# Patient Record
Sex: FEMALE | Race: WHITE | NOT HISPANIC OR LATINO | URBAN - METROPOLITAN AREA
[De-identification: names, ages, dates, MRNs, and addresses within clinical notes are randomized per-mention and may not be internally consistent; named-entity substitution may affect disease eponyms.]

---

## 2019-06-17 ENCOUNTER — APPOINTMENT (RX ONLY)
Dept: URBAN - METROPOLITAN AREA CLINIC 45 | Facility: CLINIC | Age: 74
Setting detail: DERMATOLOGY
End: 2019-06-17

## 2019-06-17 DIAGNOSIS — D18.0 HEMANGIOMA: ICD-10-CM

## 2019-06-17 DIAGNOSIS — D22 MELANOCYTIC NEVI: ICD-10-CM

## 2019-06-17 DIAGNOSIS — L82.1 OTHER SEBORRHEIC KERATOSIS: ICD-10-CM

## 2019-06-17 DIAGNOSIS — L81.4 OTHER MELANIN HYPERPIGMENTATION: ICD-10-CM

## 2019-06-17 DIAGNOSIS — L91.8 OTHER HYPERTROPHIC DISORDERS OF THE SKIN: ICD-10-CM

## 2019-06-17 DIAGNOSIS — Z85.828 PERSONAL HISTORY OF OTHER MALIGNANT NEOPLASM OF SKIN: ICD-10-CM

## 2019-06-17 PROBLEM — M12.9 ARTHROPATHY, UNSPECIFIED: Status: ACTIVE | Noted: 2019-06-17

## 2019-06-17 PROBLEM — E03.9 HYPOTHYROIDISM, UNSPECIFIED: Status: ACTIVE | Noted: 2019-06-17

## 2019-06-17 PROBLEM — D22.5 MELANOCYTIC NEVI OF TRUNK: Status: ACTIVE | Noted: 2019-06-17

## 2019-06-17 PROBLEM — D18.01 HEMANGIOMA OF SKIN AND SUBCUTANEOUS TISSUE: Status: ACTIVE | Noted: 2019-06-17

## 2019-06-17 PROBLEM — D48.5 NEOPLASM OF UNCERTAIN BEHAVIOR OF SKIN: Status: ACTIVE | Noted: 2019-06-17

## 2019-06-17 PROBLEM — I10 ESSENTIAL (PRIMARY) HYPERTENSION: Status: ACTIVE | Noted: 2019-06-17

## 2019-06-17 PROCEDURE — ? COUNSELING

## 2019-06-17 PROCEDURE — ? SHAVE REMOVAL (NO PATHOLOGY)

## 2019-06-17 PROCEDURE — 11102 TANGNTL BX SKIN SINGLE LES: CPT

## 2019-06-17 PROCEDURE — 11300 SHAVE SKIN LESION 0.5 CM/<: CPT | Mod: 59

## 2019-06-17 PROCEDURE — ? BIOPSY BY SHAVE METHOD

## 2019-06-17 PROCEDURE — 99202 OFFICE O/P NEW SF 15 MIN: CPT | Mod: 25

## 2019-06-17 PROCEDURE — ? INVENTORY

## 2019-06-17 ASSESSMENT — LOCATION DETAILED DESCRIPTION DERM
LOCATION DETAILED: RIGHT BUTTOCK
LOCATION DETAILED: RIGHT INFRAMAMMARY CREASE (INNER QUADRANT)
LOCATION DETAILED: SUPERIOR LUMBAR SPINE
LOCATION DETAILED: RIGHT INFERIOR UPPER BACK
LOCATION DETAILED: RIGHT SUPERIOR UPPER BACK

## 2019-06-17 ASSESSMENT — LOCATION SIMPLE DESCRIPTION DERM
LOCATION SIMPLE: RIGHT BREAST
LOCATION SIMPLE: RIGHT UPPER BACK
LOCATION SIMPLE: RIGHT BUTTOCK
LOCATION SIMPLE: LOWER BACK

## 2019-06-17 ASSESSMENT — LOCATION ZONE DERM: LOCATION ZONE: TRUNK

## 2019-06-17 NOTE — PROCEDURE: SHAVE REMOVAL (NO PATHOLOGY)
Anesthesia Type: 1% lidocaine with epinephrine
Path Notes (To The Dermatopathologist): Please check margins.
Hemostasis: Drysol
Detail Level: Detailed
Include Z78.9 (Other Specified Conditions Influencing Health Status) As An Associated Diagnosis?: No
Wound Care: Petrolatum
Post-Care Instructions: I reviewed with the patient in detail post-care instructions. Patient is to keep the biopsy site dry overnight, and then apply bacitracin twice daily until healed. Patient may apply hydrogen peroxide soaks to remove any crusting.
Medical Necessity Information: It is in your best interest to select a reason for this procedure from the list below. All of these items fulfill various CMS LCD requirements except the new and changing color options.
Medical Necessity Clause: This procedure was medically necessary because the lesion that was treated was:
Consent was obtained from the patient. The risks and benefits to therapy were discussed in detail. Specifically, the risks of infection, scarring, bleeding, prolonged wound healing, incomplete removal, allergy to anesthesia, nerve injury and recurrence were addressed. Prior to the procedure, the treatment site was clearly identified and confirmed by the patient. All components of Universal Protocol/PAUSE Rule completed.
X Size Of Lesion In Cm (Optional): 0

## 2019-06-17 NOTE — HPI: EVALUATION OF SKIN LESION(S)
What Type Of Note Output Would You Prefer (Optional)?: Bullet Format
Hpi Title: Evaluation of Skin Lesions
Have Your Spot(S) Been Treated In The Past?: has not been treated
4

## 2019-06-17 NOTE — PROCEDURE: BIOPSY BY SHAVE METHOD
Post-Care Instructions: I reviewed with the patient in detail post-care instructions. Patient is to keep the site covered for 24 hours. After this time period they may gently cleanse the site during baths/showers,  they may then  apply  petrolatum or Aquaphor and a bandage daily until healed.
Was A Bandage Applied: Yes
Anesthesia Volume In Cc (Will Not Render If 0): 1
Dressing: bandage
Billing Type: Third-Party Bill
Electrodesiccation And Curettage Text: The wound bed was treated with electrodesiccation and curettage after the biopsy was performed.
Lab: 6
Curettage Text: The wound bed was treated with curettage after the biopsy was performed.
Bill For Surgical Tray: no
Hemostasis: Aluminum Chloride
Silver Nitrate Text: The wound bed was treated with silver nitrate after the biopsy was performed.
Detail Level: Detailed
Biopsy Method: double edge Personna blade
Notification Instructions: Patient will be notified of biopsy results. However, patient instructed to call the office if not contacted within 2 weeks.
Additional Anesthesia Volume In Cc (Will Not Render If 0): 0
Cryotherapy Text: The wound bed was treated with cryotherapy after the biopsy was performed.
Lab Facility: 3
Wound Care: Aquaphor
Anesthesia Type: 1% lidocaine with epinephrine and a 1:10 solution of 8.4% sodium bicarbonate
Consent: Written consent was obtained and risks were reviewed including but not limited to scarring, infection, bleeding, scabbing, incomplete removal, nerve damage and allergy to anesthesia.
Biopsy Type: H and E
Electrodesiccation Text: The wound bed was treated with electrodesiccation after the biopsy was performed.
Type Of Destruction Used: Curettage
Depth Of Biopsy: dermis

## 2019-08-01 ENCOUNTER — APPOINTMENT (RX ONLY)
Dept: URBAN - METROPOLITAN AREA CLINIC 44 | Facility: CLINIC | Age: 74
Setting detail: DERMATOLOGY
End: 2019-08-01

## 2019-08-01 DIAGNOSIS — L57.0 ACTINIC KERATOSIS: ICD-10-CM

## 2019-08-01 DIAGNOSIS — L98.8 OTHER SPECIFIED DISORDERS OF THE SKIN AND SUBCUTANEOUS TISSUE: ICD-10-CM

## 2019-08-01 PROCEDURE — ? MEDICATION COUNSELING

## 2019-08-01 PROCEDURE — ? COUNSELING

## 2019-08-01 PROCEDURE — 99213 OFFICE O/P EST LOW 20 MIN: CPT

## 2019-08-01 PROCEDURE — ? ADDITIONAL NOTES

## 2019-08-01 ASSESSMENT — LOCATION DETAILED DESCRIPTION DERM: LOCATION DETAILED: MIDDLE STERNUM

## 2019-08-01 ASSESSMENT — LOCATION ZONE DERM: LOCATION ZONE: TRUNK

## 2019-08-01 ASSESSMENT — LOCATION SIMPLE DESCRIPTION DERM: LOCATION SIMPLE: CHEST

## 2019-08-01 NOTE — PROCEDURE: ADDITIONAL NOTES
Additional Notes: Patient made aware of retinol ,vitamin c serum and glycolic washes
Detail Level: Simple

## 2019-08-01 NOTE — PROCEDURE: MEDICATION COUNSELING
Xelawaisz Pregnancy And Lactation Text: This medication is Pregnancy Category D and is not considered safe during pregnancy.  The risk during breast feeding is also uncertain.

## 2021-06-24 ENCOUNTER — LAB OUTSIDE AN ENCOUNTER (OUTPATIENT)
Dept: URBAN - NONMETROPOLITAN AREA CLINIC 13 | Facility: CLINIC | Age: 76
End: 2021-06-24

## 2021-06-24 ENCOUNTER — OFFICE VISIT (OUTPATIENT)
Dept: URBAN - NONMETROPOLITAN AREA CLINIC 13 | Facility: CLINIC | Age: 76
End: 2021-06-24
Payer: MEDICARE

## 2021-06-24 DIAGNOSIS — R10.9 RIGHT FLANK PAIN: ICD-10-CM

## 2021-06-24 DIAGNOSIS — Z86.010 PERSONAL HISTORY OF COLONIC POLYPS: ICD-10-CM

## 2021-06-24 DIAGNOSIS — K21.9 GASTROESOPHAGEAL REFLUX DISEASE, UNSPECIFIED WHETHER ESOPHAGITIS PRESENT: ICD-10-CM

## 2021-06-24 DIAGNOSIS — K59.01 SLOW TRANSIT CONSTIPATION: ICD-10-CM

## 2021-06-24 DIAGNOSIS — R10.32 LEFT LOWER QUADRANT ABDOMINAL PAIN: ICD-10-CM

## 2021-06-24 DIAGNOSIS — R13.10 ESOPHAGEAL DYSPHAGIA: ICD-10-CM

## 2021-06-24 PROCEDURE — 99204 OFFICE O/P NEW MOD 45 MIN: CPT | Performed by: INTERNAL MEDICINE

## 2021-06-24 PROCEDURE — 99245 OFF/OP CONSLTJ NEW/EST HI 55: CPT | Performed by: INTERNAL MEDICINE

## 2021-06-24 RX ORDER — LOSARTAN POTASSIUM 50 MG/1
TABLET, FILM COATED ORAL
Qty: 90 UNSPECIFIED | Status: ACTIVE | COMMUNITY

## 2021-06-24 RX ORDER — ACYCLOVIR 400 MG/1
TABLET ORAL
Qty: 35 UNSPECIFIED | Status: ACTIVE | COMMUNITY

## 2021-06-24 RX ORDER — METFORMIN HYDROCHLORIDE 500 MG/1
TABLET ORAL
Qty: 90 UNSPECIFIED | Status: ACTIVE | COMMUNITY

## 2021-06-24 RX ORDER — ATORVASTATIN CALCIUM 20 MG/1
TABLET, FILM COATED ORAL
Qty: 90 UNSPECIFIED | Status: ACTIVE | COMMUNITY

## 2021-06-24 RX ORDER — DICLOFENAC SODIUM 10 MG/G
AS DIRECTED GEL TOPICAL
Status: ACTIVE | COMMUNITY

## 2021-06-24 RX ORDER — INDOMETHACIN 25 MG/1
1 CAPSULE WITH FOOD OR MILK CAPSULE ORAL TWICE A DAY
Status: ACTIVE | COMMUNITY

## 2021-06-24 RX ORDER — DULOXETINE HYDROCHLORIDE 30 MG/1
CAPSULE, DELAYED RELEASE ORAL
Qty: 30 UNSPECIFIED | Status: ACTIVE | COMMUNITY

## 2021-06-24 RX ORDER — PANTOPRAZOLE SODIUM 40 MG/1
TABLET, DELAYED RELEASE ORAL
Qty: 30 UNSPECIFIED | Status: ACTIVE | COMMUNITY

## 2021-06-24 RX ORDER — LEVOTHYROXINE SODIUM 0.09 MG/1
TABLET ORAL
Qty: 30 UNSPECIFIED | Status: ACTIVE | COMMUNITY

## 2021-06-24 NOTE — PHYSICAL EXAM GASTROINTESTINAL
Abdomen , soft, diffusely tender with marked tenderness in LLQ, nondistended , no guarding or rigidity , no masses palpable , normal bowel sounds , Liver and Spleen , no hepatomegaly present , no hepatosplenomegaly , liver nontender , spleen not palpable

## 2021-06-24 NOTE — HPI-TODAY'S VISIT:
The patient was referred by Dr. Kath Tapia for evaluation of multiple GI complaints.   A copy of this document is being forwarded to the referring provider. This is the first office visit for this 75-year-old white female who presents with irregular bowel habits, abdominal distention and discomfort, right flank pain, reflux and dysphagia. Patient describes diffuse abdominal bloating, distention and generalized discomfort with significant left lower quadrant stabbing pain that tends to come and go.  This does not awaken her from sleep at night.  She does not seem to think the bowel movements helped the discomfort.  Pain is not associated with the urge to defecate.  Her bowel movements alternate between constipation and diarrhea.  She can go several days with no bowel movement and then have multiple loose stools.  She has no evidence of bleeding.  She denies fever. There is no family history of colon cancer or colon polyps.  She has not been anemic or iron deficient.  She had a colonoscopy in 2017 at which time polyps were removed. She does describe right flank pain that is worsened with movement, coughing or deep breath.  This is been present for about the last 2 weeks.  She did have some bronchitis recently and had been doing some coughing.  She denies any trauma. Her GB is out and she has had no liver issues that she is aware of. She does take Protonix daily but continues to have occasional heartburn.  She has noted solid food dysphagia which is relieved by drinking liquids.  This does seem to be increasing in frequency.  She has no extra esophageal symptoms.  She did have an EGD in 2019 at which time erosions were identified.

## 2021-08-16 ENCOUNTER — OFFICE VISIT (OUTPATIENT)
Dept: URBAN - METROPOLITAN AREA MEDICAL CENTER 1 | Facility: MEDICAL CENTER | Age: 76
End: 2021-08-16
Payer: MEDICARE

## 2021-08-16 DIAGNOSIS — R13.19 CERVICAL DYSPHAGIA: ICD-10-CM

## 2021-08-16 DIAGNOSIS — K29.30 CHRONIC SUPERFICIAL GASTRITIS: ICD-10-CM

## 2021-08-16 PROCEDURE — 43239 EGD BIOPSY SINGLE/MULTIPLE: CPT | Performed by: INTERNAL MEDICINE

## 2021-08-16 PROCEDURE — 43450 DILATE ESOPHAGUS 1/MULT PASS: CPT | Performed by: INTERNAL MEDICINE

## 2021-09-09 ENCOUNTER — LAB OUTSIDE AN ENCOUNTER (OUTPATIENT)
Dept: URBAN - NONMETROPOLITAN AREA CLINIC 13 | Facility: CLINIC | Age: 76
End: 2021-09-09

## 2021-09-09 ENCOUNTER — WEB ENCOUNTER (OUTPATIENT)
Dept: URBAN - NONMETROPOLITAN AREA CLINIC 13 | Facility: CLINIC | Age: 76
End: 2021-09-09

## 2021-09-09 ENCOUNTER — OFFICE VISIT (OUTPATIENT)
Dept: URBAN - NONMETROPOLITAN AREA CLINIC 13 | Facility: CLINIC | Age: 76
End: 2021-09-09

## 2021-09-09 ENCOUNTER — OFFICE VISIT (OUTPATIENT)
Dept: URBAN - NONMETROPOLITAN AREA CLINIC 13 | Facility: CLINIC | Age: 76
End: 2021-09-09
Payer: MEDICARE

## 2021-09-09 VITALS
DIASTOLIC BLOOD PRESSURE: 89 MMHG | WEIGHT: 175.6 LBS | HEART RATE: 76 BPM | HEIGHT: 66 IN | BODY MASS INDEX: 28.22 KG/M2 | SYSTOLIC BLOOD PRESSURE: 145 MMHG

## 2021-09-09 DIAGNOSIS — R10.9 RIGHT FLANK PAIN: ICD-10-CM

## 2021-09-09 DIAGNOSIS — K86.2 PANCREATIC CYST: ICD-10-CM

## 2021-09-09 DIAGNOSIS — R13.10 ESOPHAGEAL DYSPHAGIA: ICD-10-CM

## 2021-09-09 DIAGNOSIS — K59.01 SLOW TRANSIT CONSTIPATION: ICD-10-CM

## 2021-09-09 DIAGNOSIS — R10.32 LEFT LOWER QUADRANT ABDOMINAL PAIN: ICD-10-CM

## 2021-09-09 DIAGNOSIS — K21.9 GASTROESOPHAGEAL REFLUX DISEASE, UNSPECIFIED WHETHER ESOPHAGITIS PRESENT: ICD-10-CM

## 2021-09-09 DIAGNOSIS — Z86.010 PERSONAL HISTORY OF COLONIC POLYPS: ICD-10-CM

## 2021-09-09 PROCEDURE — 99214 OFFICE O/P EST MOD 30 MIN: CPT | Performed by: NURSE PRACTITIONER

## 2021-09-09 RX ORDER — INDOMETHACIN 25 MG/1
1 CAPSULE WITH FOOD OR MILK CAPSULE ORAL TWICE A DAY
Status: ACTIVE | COMMUNITY

## 2021-09-09 RX ORDER — ATORVASTATIN CALCIUM 20 MG/1
TABLET, FILM COATED ORAL
Qty: 90 UNSPECIFIED | Status: ACTIVE | COMMUNITY

## 2021-09-09 RX ORDER — DULOXETINE HYDROCHLORIDE 30 MG/1
CAPSULE, DELAYED RELEASE ORAL
Qty: 30 UNSPECIFIED | Status: ACTIVE | COMMUNITY

## 2021-09-09 RX ORDER — ACYCLOVIR 400 MG/1
TABLET ORAL
Qty: 35 UNSPECIFIED | Status: ACTIVE | COMMUNITY

## 2021-09-09 RX ORDER — METFORMIN HYDROCHLORIDE 500 MG/1
TABLET ORAL
Qty: 90 UNSPECIFIED | Status: ACTIVE | COMMUNITY

## 2021-09-09 RX ORDER — LEVOTHYROXINE SODIUM 0.09 MG/1
TABLET ORAL
Qty: 30 UNSPECIFIED | Status: ACTIVE | COMMUNITY

## 2021-09-09 RX ORDER — LOSARTAN POTASSIUM 50 MG/1
TABLET, FILM COATED ORAL
Qty: 90 UNSPECIFIED | Status: ACTIVE | COMMUNITY

## 2021-09-09 RX ORDER — DICLOFENAC SODIUM 10 MG/G
AS DIRECTED GEL TOPICAL
Status: ACTIVE | COMMUNITY

## 2021-09-09 RX ORDER — PANTOPRAZOLE SODIUM 40 MG/1
TABLET, DELAYED RELEASE ORAL
Qty: 30 UNSPECIFIED | Status: ACTIVE | COMMUNITY

## 2021-09-09 NOTE — HPI-TODAY'S VISIT:
6/24/21 The patient was referred by Dr. aKth Tapia for evaluation of multiple GI complaints.   A copy of this document is being forwarded to the referring provider. This is the first office visit for this 75-year-old white female who presents with irregular bowel habits, abdominal distention and discomfort, right flank pain, reflux and dysphagia. Patient describes diffuse abdominal bloating, distention and generalized discomfort with significant left lower quadrant stabbing pain that tends to come and go.  This does not awaken her from sleep at night.  She does not seem to think the bowel movements helped the discomfort.  Pain is not associated with the urge to defecate.  Her bowel movements alternate between constipation and diarrhea.  She can go several days with no bowel movement and then have multiple loose stools.  She has no evidence of bleeding.  She denies fever. There is no family history of colon cancer or colon polyps.  She has not been anemic or iron deficient.  She had a colonoscopy in 2017 at which time polyps were removed. She does describe right flank pain that is worsened with movement, coughing or deep breath.  This is been present for about the last 2 weeks.  She did have some bronchitis recently and had been doing some coughing.  She denies any trauma. Her GB is out and she has had no liver issues that she is aware of. She does take Protonix daily but continues to have occasional heartburn.  She has noted solid food dysphagia which is relieved by drinking liquids.  This does seem to be increasing in frequency.  She has no extra esophageal symptoms.  She did have an EGD in 2019 at which time erosions were identified.  9/9/21 Brii presents for follow-up after EGD and and colonoscopy. EGD by Dr. Bosch 8/16/2021 with no endoscopic abnormality to explain her dysphagia status post empiric dilation and otherwise normal-appearing esophagus, gastric mucosa, and duodenum.  Gastric biopsy with mild chronic gastritis negative for Helicobacter and esophageal biopsy with benign mucosa. She also had CT 6/30/2021 with a small cystic structure seen just inferior to the head of the pancreas and probably is pancreatic in origin with recommendation to repeat imaging in 6 months, moderate hepatic steatosis, no inflammatory process to explain pain She did complete the x-ray of her ribs that Dr. Bosch ordered that revealed probable acute rib fracture to the medial most posterior right 12th rib. She denies any fall or traumatic event to explain her rib fracture.  She does agree that this is led to more sedentary lifestyle due to her discomfort and has likely contributed to her constipation which she is struggling with.  TG

## 2021-09-21 ENCOUNTER — OFFICE VISIT (OUTPATIENT)
Dept: URBAN - NONMETROPOLITAN AREA SURGERY CENTER 1 | Facility: SURGERY CENTER | Age: 76
End: 2021-09-21
Payer: MEDICARE

## 2021-09-21 ENCOUNTER — CLAIMS CREATED FROM THE CLAIM WINDOW (OUTPATIENT)
Dept: URBAN - METROPOLITAN AREA CLINIC 4 | Facility: CLINIC | Age: 76
End: 2021-09-21
Payer: MEDICARE

## 2021-09-21 DIAGNOSIS — R10.32 ABDOMINAL CRAMPING IN LEFT LOWER QUADRANT: ICD-10-CM

## 2021-09-21 DIAGNOSIS — K63.89 POLYP, HYPERPLASTIC: ICD-10-CM

## 2021-09-21 DIAGNOSIS — R19.4 ALTERATION IN BOWEL ELIMINATION: ICD-10-CM

## 2021-09-21 PROCEDURE — 88305 TISSUE EXAM BY PATHOLOGIST: CPT | Performed by: PATHOLOGY

## 2021-09-21 PROCEDURE — 45380 COLONOSCOPY AND BIOPSY: CPT | Performed by: INTERNAL MEDICINE

## 2021-09-21 PROCEDURE — G8907 PT DOC NO EVENTS ON DISCHARG: HCPCS | Performed by: INTERNAL MEDICINE

## 2021-09-21 RX ORDER — DICLOFENAC SODIUM 10 MG/G
AS DIRECTED GEL TOPICAL
Status: ACTIVE | COMMUNITY

## 2021-09-21 RX ORDER — METFORMIN HYDROCHLORIDE 500 MG/1
TABLET ORAL
Qty: 90 UNSPECIFIED | Status: ACTIVE | COMMUNITY

## 2021-09-21 RX ORDER — ACYCLOVIR 400 MG/1
TABLET ORAL
Qty: 35 UNSPECIFIED | Status: ACTIVE | COMMUNITY

## 2021-09-21 RX ORDER — INDOMETHACIN 25 MG/1
1 CAPSULE WITH FOOD OR MILK CAPSULE ORAL TWICE A DAY
Status: ACTIVE | COMMUNITY

## 2021-09-21 RX ORDER — LOSARTAN POTASSIUM 50 MG/1
TABLET, FILM COATED ORAL
Qty: 90 UNSPECIFIED | Status: ACTIVE | COMMUNITY

## 2021-09-21 RX ORDER — ATORVASTATIN CALCIUM 20 MG/1
TABLET, FILM COATED ORAL
Qty: 90 UNSPECIFIED | Status: ACTIVE | COMMUNITY

## 2021-09-21 RX ORDER — LEVOTHYROXINE SODIUM 0.09 MG/1
TABLET ORAL
Qty: 30 UNSPECIFIED | Status: ACTIVE | COMMUNITY

## 2021-09-21 RX ORDER — PANTOPRAZOLE SODIUM 40 MG/1
TABLET, DELAYED RELEASE ORAL
Qty: 30 UNSPECIFIED | Status: ACTIVE | COMMUNITY

## 2021-09-21 RX ORDER — DULOXETINE HYDROCHLORIDE 30 MG/1
CAPSULE, DELAYED RELEASE ORAL
Qty: 30 UNSPECIFIED | Status: ACTIVE | COMMUNITY

## 2021-10-29 ENCOUNTER — LAB OUTSIDE AN ENCOUNTER (OUTPATIENT)
Dept: URBAN - NONMETROPOLITAN AREA CLINIC 2 | Facility: CLINIC | Age: 76
End: 2021-10-29

## 2021-10-29 ENCOUNTER — OFFICE VISIT (OUTPATIENT)
Dept: URBAN - NONMETROPOLITAN AREA CLINIC 2 | Facility: CLINIC | Age: 76
End: 2021-10-29
Payer: MEDICARE

## 2021-10-29 DIAGNOSIS — R10.9 RIGHT FLANK PAIN: ICD-10-CM

## 2021-10-29 DIAGNOSIS — K21.9 GASTROESOPHAGEAL REFLUX DISEASE, UNSPECIFIED WHETHER ESOPHAGITIS PRESENT: ICD-10-CM

## 2021-10-29 DIAGNOSIS — K86.2 PANCREATIC CYST: ICD-10-CM

## 2021-10-29 DIAGNOSIS — K59.01 SLOW TRANSIT CONSTIPATION: ICD-10-CM

## 2021-10-29 DIAGNOSIS — Z86.010 PERSONAL HISTORY OF COLONIC POLYPS: ICD-10-CM

## 2021-10-29 DIAGNOSIS — K76.0 FATTY LIVER: ICD-10-CM

## 2021-10-29 DIAGNOSIS — R13.10 ESOPHAGEAL DYSPHAGIA: ICD-10-CM

## 2021-10-29 PROCEDURE — 99214 OFFICE O/P EST MOD 30 MIN: CPT | Performed by: INTERNAL MEDICINE

## 2021-10-29 RX ORDER — PANTOPRAZOLE SODIUM 40 MG/1
TABLET, DELAYED RELEASE ORAL
Qty: 30 UNSPECIFIED | Status: ACTIVE | COMMUNITY

## 2021-10-29 RX ORDER — DULOXETINE HYDROCHLORIDE 30 MG/1
CAPSULE, DELAYED RELEASE ORAL
Qty: 30 UNSPECIFIED | Status: ACTIVE | COMMUNITY

## 2021-10-29 RX ORDER — ACYCLOVIR 400 MG/1
TABLET ORAL
Qty: 35 UNSPECIFIED | Status: ACTIVE | COMMUNITY

## 2021-10-29 RX ORDER — DICLOFENAC SODIUM 10 MG/G
AS DIRECTED GEL TOPICAL
Status: ACTIVE | COMMUNITY

## 2021-10-29 RX ORDER — INDOMETHACIN 25 MG/1
1 CAPSULE WITH FOOD OR MILK CAPSULE ORAL TWICE A DAY
Status: ACTIVE | COMMUNITY

## 2021-10-29 RX ORDER — LEVOTHYROXINE SODIUM 0.09 MG/1
TABLET ORAL
Qty: 30 UNSPECIFIED | Status: ACTIVE | COMMUNITY

## 2021-10-29 RX ORDER — ATORVASTATIN CALCIUM 20 MG/1
TABLET, FILM COATED ORAL
Qty: 90 UNSPECIFIED | Status: ACTIVE | COMMUNITY

## 2021-10-29 RX ORDER — METFORMIN HYDROCHLORIDE 500 MG/1
TABLET ORAL
Qty: 90 UNSPECIFIED | Status: ACTIVE | COMMUNITY

## 2021-10-29 RX ORDER — LOSARTAN POTASSIUM 50 MG/1
TABLET, FILM COATED ORAL
Qty: 90 UNSPECIFIED | Status: ACTIVE | COMMUNITY

## 2021-10-29 NOTE — HPI-TODAY'S VISIT:
Patient states that, overall, she is much improved. Her left lower quadrant pain has resolved. Her constipation is improved. She is having a solid, formed stool daily. Her stools do feel adequate. She has no evidence of bleeding. She denies gas or bloating. Her appetite and weight are stable. Her reflux is well controlled. She is on Protonix daily. Her dysphagia has resolved with dilation. She does have fatty liver disease and a pancreatic cyst. She has no symptoms related to these. She does need imaging and labs.

## 2021-10-30 LAB
A/G RATIO: 2.1
ALBUMIN: 4.7
ALKALINE PHOSPHATASE: 59
ALT (SGPT): 26
AST (SGOT): 20
BASO (ABSOLUTE): 0.1
BASOS: 1
BILIRUBIN, TOTAL: 0.7
BUN/CREATININE RATIO: 15
BUN: 12
CALCIUM: 10
CARBON DIOXIDE, TOTAL: 24
CHLORIDE: 107
CREATININE: 0.78
EGFR IF AFRICN AM: 87
EGFR IF NONAFRICN AM: 75
EOS (ABSOLUTE): 0.1
EOS: 2
GLOBULIN, TOTAL: 2.2
GLUCOSE: 124
HEMATOCRIT: 38.8
HEMATOLOGY COMMENTS:: (no result)
HEMOGLOBIN: 13.1
IMMATURE CELLS: (no result)
IMMATURE GRANS (ABS): 0
IMMATURE GRANULOCYTES: 0
LYMPHS (ABSOLUTE): 2.9
LYMPHS: 45
MCH: 31.4
MCHC: 33.8
MCV: 93
MONOCYTES(ABSOLUTE): 0.3
MONOCYTES: 5
NEUTROPHILS (ABSOLUTE): 3.1
NEUTROPHILS: 47
NRBC: (no result)
PLATELETS: 202
POTASSIUM: 4.2
PROTEIN, TOTAL: 6.9
RBC: 4.17
RDW: 11.9
SODIUM: 146
WBC: 6.5

## 2021-12-13 NOTE — PROCEDURE: MEDICATION COUNSELING
EYSSI Blair Rhofade Counseling: Rhofade is a topical medication which can decrease superficial blood flow where applied. Side effects are uncommon and include stinging, redness and allergic reactions.

## 2022-01-01 NOTE — PROCEDURE: MEDICATION COUNSELING
Albendazole Pregnancy And Lactation Text: This medication is Pregnancy Category C and it isn't known if it is safe during pregnancy. It is also excreted in breast milk. Spinal

## 2022-04-29 ENCOUNTER — OFFICE VISIT (OUTPATIENT)
Dept: URBAN - NONMETROPOLITAN AREA CLINIC 13 | Facility: CLINIC | Age: 77
End: 2022-04-29

## 2022-08-15 ENCOUNTER — LAB OUTSIDE AN ENCOUNTER (OUTPATIENT)
Dept: URBAN - NONMETROPOLITAN AREA CLINIC 13 | Facility: CLINIC | Age: 77
End: 2022-08-15

## 2022-08-15 ENCOUNTER — LAB OUTSIDE AN ENCOUNTER (OUTPATIENT)
Dept: URBAN - NONMETROPOLITAN AREA CLINIC 2 | Facility: CLINIC | Age: 77
End: 2022-08-15

## 2022-08-15 ENCOUNTER — OFFICE VISIT (OUTPATIENT)
Dept: URBAN - NONMETROPOLITAN AREA CLINIC 13 | Facility: CLINIC | Age: 77
End: 2022-08-15
Payer: MEDICARE

## 2022-08-15 VITALS
WEIGHT: 169 LBS | HEART RATE: 90 BPM | SYSTOLIC BLOOD PRESSURE: 133 MMHG | HEIGHT: 66 IN | DIASTOLIC BLOOD PRESSURE: 72 MMHG | TEMPERATURE: 97 F | BODY MASS INDEX: 27.16 KG/M2

## 2022-08-15 DIAGNOSIS — K59.01 SLOW TRANSIT CONSTIPATION: ICD-10-CM

## 2022-08-15 DIAGNOSIS — K21.9 GASTROESOPHAGEAL REFLUX DISEASE, UNSPECIFIED WHETHER ESOPHAGITIS PRESENT: ICD-10-CM

## 2022-08-15 DIAGNOSIS — K86.2 PANCREATIC CYST: ICD-10-CM

## 2022-08-15 DIAGNOSIS — K76.0 FATTY LIVER: ICD-10-CM

## 2022-08-15 DIAGNOSIS — Z86.010 PERSONAL HISTORY OF COLONIC POLYPS: ICD-10-CM

## 2022-08-15 DIAGNOSIS — R10.84 ABDOMINAL CRAMPING, GENERALIZED: ICD-10-CM

## 2022-08-15 DIAGNOSIS — R13.19 CERVICAL DYSPHAGIA: ICD-10-CM

## 2022-08-15 LAB
ANION GAP: 10
BLOOD UREA NITROGEN: 17
BUN / CREAT RATIO: 18
BUN / CREAT RATIO: 18
CALCIUM: 10.1
CALCIUM: 10.1
CHLORIDE: 107
CO2: 27
CREATININE, SERUM: 0.93
EGFR (CKD-EPI): >60
GLUCOSE: 165
POTASSIUM: 4.7
SODIUM: 139

## 2022-08-15 PROCEDURE — 99214 OFFICE O/P EST MOD 30 MIN: CPT | Performed by: NURSE PRACTITIONER

## 2022-08-15 RX ORDER — ATORVASTATIN CALCIUM 20 MG/1
TABLET, FILM COATED ORAL
Qty: 90 UNSPECIFIED | Status: ACTIVE | COMMUNITY

## 2022-08-15 RX ORDER — LOSARTAN POTASSIUM 50 MG/1
TABLET, FILM COATED ORAL
Qty: 90 UNSPECIFIED | Status: ACTIVE | COMMUNITY

## 2022-08-15 RX ORDER — PANTOPRAZOLE SODIUM 40 MG/1
1 TABLET TABLET, DELAYED RELEASE ORAL ONCE A DAY
Qty: 90 | Refills: 3

## 2022-08-15 RX ORDER — DICLOFENAC SODIUM 10 MG/G
AS DIRECTED GEL TOPICAL
Status: ACTIVE | COMMUNITY

## 2022-08-15 RX ORDER — LEVOTHYROXINE SODIUM 0.09 MG/1
TABLET ORAL
Qty: 30 UNSPECIFIED | Status: ACTIVE | COMMUNITY

## 2022-08-15 RX ORDER — ACYCLOVIR 400 MG/1
TABLET ORAL
Qty: 35 UNSPECIFIED | Status: ACTIVE | COMMUNITY

## 2022-08-15 RX ORDER — INDOMETHACIN 25 MG/1
1 CAPSULE WITH FOOD OR MILK CAPSULE ORAL TWICE A DAY
Status: ACTIVE | COMMUNITY

## 2022-08-15 RX ORDER — PANTOPRAZOLE SODIUM 40 MG/1
TABLET, DELAYED RELEASE ORAL
Qty: 30 UNSPECIFIED | Status: ACTIVE | COMMUNITY

## 2022-08-15 RX ORDER — DULOXETINE HYDROCHLORIDE 30 MG/1
CAPSULE, DELAYED RELEASE ORAL
Qty: 30 UNSPECIFIED | Status: ACTIVE | COMMUNITY

## 2022-08-15 RX ORDER — METFORMIN HYDROCHLORIDE 500 MG/1
TABLET ORAL
Qty: 90 UNSPECIFIED | Status: ACTIVE | COMMUNITY

## 2022-08-15 NOTE — HPI-TODAY'S VISIT:
8/15/2022 Ms. Brii Douglas is a 76 year old female here of abdominal pain and IBS. She is a patient of Dr Bosch last seen in 2021. She has been on protonix 40mg daily wiht good controll of her reflux. She denies any trouble swallowing. Her main complaint today is LLQ pain. This is tender to palpation. She denies any constipation. She has a BM every day or every other. She had a hysterectomy and her left ovary was left. She is concerned this is related to her ovary.  She had a CT scan last year with stable pancreatic cyst in the uncinate process with communication with the pancreatic duct. Her brother  of pancreatic cancer. Her weight and appetite are stable. CS

## 2022-08-15 NOTE — HPI-OTHER HISTORIES
10/2021 Patient states that, overall, she is much improved. Her left lower quadrant pain has resolved. Her constipation is improved. She is having a solid, formed stool daily. Her stools do feel adequate. She has no evidence of bleeding. She denies gas or bloating. Her appetite and weight are stable. Her reflux is well controlled. She is on Protonix daily. Her dysphagia has resolved with dilation. She does have fatty liver disease and a pancreatic cyst. She has no symptoms related to these. She does need imaging and labs.  11/2021 CT abdomen: hepatic steatosis. Table from 7/2021- CBD 11mm stable. CCY. 1.5 x 1.2 cm cystic lesion adjacent ot the uncinate process of pancreas with communication with pancreatic duct- mild prominence of the pancreatic duct, atrophy of unicate process

## 2022-08-18 LAB — CA 19-9: 6

## 2022-10-05 ENCOUNTER — TELEPHONE ENCOUNTER (OUTPATIENT)
Dept: URBAN - METROPOLITAN AREA CLINIC 92 | Facility: CLINIC | Age: 77
End: 2022-10-05

## 2022-11-14 ENCOUNTER — LAB OUTSIDE AN ENCOUNTER (OUTPATIENT)
Dept: URBAN - NONMETROPOLITAN AREA CLINIC 13 | Facility: CLINIC | Age: 77
End: 2022-11-14

## 2022-11-14 ENCOUNTER — OFFICE VISIT (OUTPATIENT)
Dept: URBAN - NONMETROPOLITAN AREA CLINIC 13 | Facility: CLINIC | Age: 77
End: 2022-11-14
Payer: MEDICARE

## 2022-11-14 VITALS
SYSTOLIC BLOOD PRESSURE: 145 MMHG | BODY MASS INDEX: 26.84 KG/M2 | HEIGHT: 66 IN | DIASTOLIC BLOOD PRESSURE: 85 MMHG | HEART RATE: 89 BPM | WEIGHT: 167 LBS

## 2022-11-14 DIAGNOSIS — K86.2 PANCREATIC CYST: ICD-10-CM

## 2022-11-14 DIAGNOSIS — K59.01 SLOW TRANSIT CONSTIPATION: ICD-10-CM

## 2022-11-14 DIAGNOSIS — K76.0 FATTY LIVER: ICD-10-CM

## 2022-11-14 DIAGNOSIS — Z86.010 PERSONAL HISTORY OF COLONIC POLYPS: ICD-10-CM

## 2022-11-14 DIAGNOSIS — R19.7 DIARRHEA, UNSPECIFIED TYPE: ICD-10-CM

## 2022-11-14 DIAGNOSIS — K21.9 GASTROESOPHAGEAL REFLUX DISEASE, UNSPECIFIED WHETHER ESOPHAGITIS PRESENT: ICD-10-CM

## 2022-11-14 DIAGNOSIS — R10.9 RIGHT FLANK PAIN: ICD-10-CM

## 2022-11-14 DIAGNOSIS — R13.10 ESOPHAGEAL DYSPHAGIA: ICD-10-CM

## 2022-11-14 PROBLEM — 197321007: Status: ACTIVE | Noted: 2021-10-29

## 2022-11-14 PROBLEM — 428283002: Status: ACTIVE | Noted: 2021-06-24

## 2022-11-14 PROCEDURE — 99214 OFFICE O/P EST MOD 30 MIN: CPT | Performed by: NURSE PRACTITIONER

## 2022-11-14 RX ORDER — ATORVASTATIN CALCIUM 20 MG/1
TABLET, FILM COATED ORAL
Qty: 90 UNSPECIFIED | Status: ACTIVE | COMMUNITY

## 2022-11-14 RX ORDER — INDOMETHACIN 25 MG/1
1 CAPSULE WITH FOOD OR MILK CAPSULE ORAL TWICE A DAY
Status: ACTIVE | COMMUNITY

## 2022-11-14 RX ORDER — DULOXETINE HYDROCHLORIDE 30 MG/1
CAPSULE, DELAYED RELEASE ORAL
Qty: 30 UNSPECIFIED | Status: ACTIVE | COMMUNITY

## 2022-11-14 RX ORDER — PANTOPRAZOLE SODIUM 40 MG/1
1 TABLET TABLET, DELAYED RELEASE ORAL ONCE A DAY
Qty: 90 | Refills: 3 | Status: ACTIVE | COMMUNITY

## 2022-11-14 RX ORDER — PANTOPRAZOLE SODIUM 40 MG/1
1 TABLET TABLET, DELAYED RELEASE ORAL ONCE A DAY
Qty: 90 | Refills: 3

## 2022-11-14 RX ORDER — LEVOTHYROXINE SODIUM 0.09 MG/1
TABLET ORAL
Qty: 30 UNSPECIFIED | Status: ACTIVE | COMMUNITY

## 2022-11-14 RX ORDER — DICLOFENAC SODIUM 10 MG/G
AS DIRECTED GEL TOPICAL
Status: ACTIVE | COMMUNITY

## 2022-11-14 RX ORDER — METFORMIN HYDROCHLORIDE 500 MG/1
TABLET ORAL
Qty: 90 UNSPECIFIED | Status: ACTIVE | COMMUNITY

## 2022-11-14 RX ORDER — ACYCLOVIR 400 MG/1
TABLET ORAL
Qty: 35 UNSPECIFIED | Status: ACTIVE | COMMUNITY

## 2022-11-14 RX ORDER — LOSARTAN POTASSIUM 50 MG/1
TABLET, FILM COATED ORAL
Qty: 90 UNSPECIFIED | Status: ACTIVE | COMMUNITY

## 2022-11-14 NOTE — PHYSICAL EXAM GASTROINTESTINAL
"Lianna is a 40 year old who is being evaluated via a billable telephone visit.      What phone number would you like to be contacted at? [phone]  How would you like to obtain your AVS? Beto  Phone call duration: 25 minutes        Outpatient Psychiatric Progress Note    Name: Lianna Sorto   : 1981                    Primary Care Provider: DANA Amos CNP   Therapist: none    PHQ-9 scores:  PHQ-9 SCORE 2021   PHQ-9 Total Score - - -   PHQ-9 Total Score 7 7 7       FAUSTINO-7 scores:  FAUSTINO-7 SCORE 2021   Total Score - - -   Total Score 3 0 4       Patient Identification:    Patient is a 40 year old year old, single  White American female  who presents for return visit with me.  Patient is currently employed full time. Patient attended the session alone. Patient prefers to be called: \"Lianna\".    Current medications include: blood glucose (NO BRAND SPECIFIED) test strip, Use to test blood sugar 2 times daily or as directed. To accompany: Blood Glucose Monitor Brands: per insurance.  blood glucose monitoring (NO BRAND SPECIFIED) meter device kit, Use to test blood sugar 2 times daily or as directed. Preferred blood glucose meter OR supplies to accompany: Blood Glucose Monitor Brands: per insurance.  buPROPion (WELLBUTRIN XL) 150 MG 24 hr tablet, Take 1 tablet (150 mg) by mouth every morning  cetirizine (ZYRTEC) 10 MG tablet, Take 10 mg by mouth daily  chlorhexidine (HIBICLENS) 4 % liquid, Use to wash groin daily.  clindamycin (CLINDAMAX) 1 % external lotion, Apply to affected areas twice daily  cyclobenzaprine (FLEXERIL) 10 MG tablet, Take 1 tablet (10 mg) by mouth 3 times daily as needed for muscle spasms  famotidine (PEPCID) 20 MG tablet, Take 1 tablet (20 mg) by mouth 2 times daily  fluticasone (FLONASE) 50 MCG/ACT nasal spray, Spray 2 sprays into both nostrils daily  lisdexamfetamine (VYVANSE) 60 MG capsule, Take 1 capsule (60 mg) by mouth every " Abdomen , soft, nontender, nondistended , no guarding or rigidity , no masses palpable , normal bowel sounds , Liver and Spleen , no hepatosplenomegaly , liver nontender , spleen not palpable morning refill 28 days after last fill  metFORMIN (GLUCOPHAGE-XR) 500 MG 24 hr tablet, Take 1 tablet (500 mg) by mouth daily (with dinner)  order for DME, Equipment being ordered: Dynaflex insert  SETLAKIN 0.15-0.03 MG tablet, TAKE 1 TABLET BY MOUTH DAILY  spironolactone (ALDACTONE) 100 MG tablet, TAKE ONE TABLET BY MOUTH ONCE DAILY  thin (NO BRAND SPECIFIED) lancets, Use with lanceting device. To accompany: Blood Glucose Monitor Brands: per insurance.  triamcinolone (KENALOG) 0.1 % external cream, Apply to affected areas twice daily for 2 weeks, then as needed only    No current facility-administered medications on file prior to visit.       The [place] Prescription Monitoring Program has been reviewed and there are no concerns about diversionary activity for controlled substances at this time.      I was able to review most recent Primary Care Provider, specialty provider, and therapy visit notes that I have access to.     Today, patient reports that she has time off this week.  Some rare moments of feeling down.  Anxiety is manageable.  Crowds increase her anxiety.    She weighs 225 pounds.  A new coworker is creating stress for her as she has little experience.  She lacerated her hand in January that required 21 stitches.  She is taking a trip to [place] in July.       has a past medical history of Abnormal Pap smear of cervix (2011), Acute tonsillitis (2008), Condylomata (4/7/2011), Endometritis (2008), Hidradenitis (7/3/2009), Intussusception (H) (1983), and Unspecified trauma to perineum and vulva, unspecified as to episode of care in pregnancy (1985).    She has no past medical history of Basal cell carcinoma or Squamous cell carcinoma.    Social history updates:    She  Is a single parent.    Substance use updates:    No alcohol use reported  Tobacco use: Yes E-cigarettes  Ready to quit?  No  Nicotine Replacement Therapy tried: none     Vital Signs:   There were no vitals taken for this  visit.    Labs:    Most recent laboratory results reviewed and no new labs.       Mental Status Examination:  Appearance:  awake, alert  Attitude:  cooperative   Eye Contact:  unable to assess  Gait and Station: No assistive Devices used and No dizziness or falls  Psychomotor Behavior:  unnable to assess  Oriented to:  time, person, and place  Attention Span and Concentration:  Normal  Speech:   clear, coherent and Speaks: English  Mood:  anxious  Affect:  mood congruent  Associations:  no loose associations  Thought Process:  goal oriented  Thought Content:  no evidence of suicidal ideation or homicidal ideation, no auditory hallucinations present and no visual hallucinations present  Recent and Remote Memory:  intact Not formally assessed. No amnesia.  Fund of Knowledge: appropriate  Insight:  good  Judgment:  intact  Impulse Control:  intact    Suicide Risk Assessment:  Today Lianna Sorto reports that she is having no thoughts to harm herself or others. In addition, there are notable risk factors for self-harm, including single status, anxiety and mood change. However, risk is mitigated by commitment to family, history of seeking help when needed, future oriented, denies suicidal intent or plan and denies homicidal ideation, intent, or plan. Therefore, based on all available evidence including the factors cited above, Lianna Sorto does not appear to be at imminent risk for self-harm, does not meet criteria for a 72-hr hold, and therefore remains appropriate for ongoing outpatient level of care.  A thorough assessment of risk factors related to suicide and self-harm have been reviewed and are noted above. The patient convincingly denies suicidality on several occasions. Local community safety resources printed and reviewed for patient to use if needed. There was no deceit detected, and the patient presented in a manner that was believable.     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  300.02 (F41.1) Generalized Anxiety Disorder  307.51 (F50.8) Binge-Eating Disorder In partial remission  Severity: Moderate    Medical comorbidities include:   Patient Active Problem List    Diagnosis Date Noted     Morbid obesity (H) 11/02/2020     Priority: Medium     Diabetes mellitus, type 2 (H) 08/07/2020     Priority: Medium     Moderate episode of recurrent major depressive disorder (H) 09/22/2017     Priority: Medium     Controlled substance agreement signed 09/28/2016     Priority: Medium     Hidradenitis suppurativa 11/02/2015     Priority: Medium     Anxiety 06/02/2014     Priority: Medium     Pelvic pain in female 11/13/2013     Priority: Medium     Fibromyalgia 05/28/2013     Priority: Medium     Piriformis syndrome 12/12/2012     Priority: Medium     Scapular dyskinesis 03/13/2012     Priority: Medium     Varicose veins of legs 07/13/2011     Priority: Medium     Sacroiliac pain 07/13/2011     Priority: Medium     Back muscle spasm 06/06/2011     Priority: Medium     Tension headache 06/06/2011     Priority: Medium     CARDIOVASCULAR SCREENING; LDL GOAL LESS THAN 130 06/06/2011     Priority: Medium     Atypical squamous cells of undetermined significance (ASCUS) on Papanicolaou smear of cervix 03/22/2011     Priority: Medium     3/22/11: Pap - ASCUS, + HPV 16. Plan colp.  4/7/11:[place]--benign. Repeat pap 6 months. Reminder placed in epic.   11/22/11:Pap--ASCUS +(low risk) HPV type 54. Rpt pap in 1 yr. In , ep, prob list, hx updated. Reminder sent.  2/20/13:Pap--NIL. Pap 1 year. Reminder placed in EPIC  3/18/14: Pap - NIL, Neg HPV. Repeat pap 3 yrs.   6/7/2017 Pap: ASCUS, neg HPV. Plan to repeat Pap+HPV cotesting in 3 yrs (2020)  11/2/20 NIL Pap, Neg HPV. Plan cotest in 5 years.          DDD (degenerative disc disease), cervical 03/10/2011     Priority: Medium      Acne 03/10/2011     Priority: Medium     Attention deficit disorder of adult 09/22/2010     Priority: Medium     Allergic rhinitis 03/30/2007     Priority: Medium     Problem list name updated by automated process. Provider to review         Assessment:    Lianna Sorto  Is stable on her current medication regiment.  Anxiety is higher than usual but is attributed to changes in her work milieu. Yet, she  Is able to complete job tasks and tend to household responsibilities as a single parent.   .Friends are available  To her for support.  Vyvanse will continue at 60 mg daily.  Weight is a concern for her as she identifies that her eating habits stem f rom emotional circumstances.  She has not lost or gained any weight.  Wellbutrin XL will continue at 150 mg daily.  No increases will be necessary at this time to avoid any more increase in her anxiety.      Medication side effects and alternatives were reviewed. Health promotion activities recommended and reviewed today. All questions addressed. Education and counseling completed regarding risks and benefits of medications and psychotherapy options.    Treatment Plan:        1.  Continue Vyvanse 60 mg aily    2.  Continue Wellbutrin  mg daily        Continue all other medications as reviewed per electronic medical record today.     Safety plan reviewed. To the Emergency Department as needed or call after hours crisis line at [phone] or [phone]. [place] Crisis Text Line. Text [place] to 865080 or Suicide LifeLine Chat: suicidepreventionlifeline.org/chat/    To schedule individual or family therapy, call [facility] at [phone].    Schedule an appointment with me in 3 months or sooner as needed. Call [facility] at [phone] to schedule.    Follow up with primary care provider as planned or for acute medical concerns.    Call the psychiatric nurse line with medication questions or concerns at [phone].    Beto  may be used to communicate with your provider, but this is not intended to be used for emergencies.    Crisis Resources:    National Suicide Prevention Lifeline: 129.244.6234 (TTY: 390.260.9068). Call anytime for help.  (www.suicidepreventionlifeline.org)  National [place] on Mental Illness (www.sina.org): 743.781.3212 or 205-763-8198.   Mental Health Association (www.mentalhealth.org): 743.872.4871 or 320-659-6812.  [place] Crisis Text Line: Text [place] to 086945  Suicide LifeLine Chat: suicide[employer].org/chat    Administrative Billing:   Time spent with patient was spent in counseling and coordination of care regarding above diagnoses and treatment plan.    Patient Status:  Patient will continue to be seen for ongoing consultation and stabilization.    Signed:   ELIANA Jeffrey-BC   Psychiatry

## 2022-11-14 NOTE — HPI-TODAY'S VISIT:
2022 Ms. Brii Douglas is here for f/u of abdominal pain. At her last OV, she was having LLQ abdominal pain despite normal BM every day or every other. She was very concerned about her left ovary. She had a CT scan that showed no acute findings. Today, she continues to have the discomfort. Her  is very concerned about her ongoing diarrhea for which she did not complain of prior. He reports this is more than once a week and when it occurs it is frequent and urgent. She denies some of these complaints. Last year, Dr Bosch did a colonoscopy with random bx that were negative. Her  is concerned she has a bacteria. Again the diarrhea is not daily. She had labs with an ortho and had a positive BANDAR. She is concerned she has IBD. She denies any blood in her stool.  She had a CT scan last year with stable pancreatic cyst in the uncinate process with communication with the pancreatic duct. Her brother  of pancreatic cancer. Her CA 19-9 was negative. Her repeat CT scan again showed stability but given her hx and dilated CBD Dr Brokc recommended an EUS, She is now ready to proceed. CS

## 2022-11-14 NOTE — HPI-OTHER HISTORIES
10/2021 Patient states that, overall, she is much improved. Her left lower quadrant pain has resolved. Her constipation is improved. She is having a solid, formed stool daily. Her stools do feel adequate. She has no evidence of bleeding. She denies gas or bloating. Her appetite and weight are stable. Her reflux is well controlled. She is on Protonix daily. Her dysphagia has resolved with dilation. She does have fatty liver disease and a pancreatic cyst. She has no symptoms related to these. She does need imaging and labs.  2021 CT abdomen: hepatic steatosis. Table from 2021- CBD 11mm stable. CCY. 1.5 x 1.2 cm cystic lesion adjacent ot the uncinate process of pancreas with communication with pancreatic duct- mild prominence of the pancreatic duct, atrophy of unicate process  8/15/2022 Ms. Brii Douglas is a 76 year old female here of abdominal pain and IBS. She is a patient of Dr Bosch last seen in 2021. She has been on protonix 40mg daily wiht good controll of her reflux. She denies any trouble swallowing. Her main complaint today is LLQ pain. This is tender to palpation. She denies any constipation. She has a BM every day or every other. She had a hysterectomy and her left ovary was left. She is concerned this is related to her ovary.  She had a CT scan last year with stable pancreatic cyst in the uncinate process with communication with the pancreatic duct. Her brother  of pancreatic cancer. Her weight and appetite are stable. CS

## 2022-12-02 ENCOUNTER — OFFICE VISIT (OUTPATIENT)
Dept: URBAN - METROPOLITAN AREA MEDICAL CENTER 1 | Facility: MEDICAL CENTER | Age: 77
End: 2022-12-02
Payer: MEDICARE

## 2022-12-02 DIAGNOSIS — K76.89 ABNORMAL FINDING ON LIVER FUNCTION: ICD-10-CM

## 2022-12-02 DIAGNOSIS — K86.2 CYST OF PANCREAS: ICD-10-CM

## 2022-12-02 PROCEDURE — 43259 EGD US EXAM DUODENUM/JEJUNUM: CPT | Performed by: INTERNAL MEDICINE

## 2023-03-17 ENCOUNTER — CLAIMS CREATED FROM THE CLAIM WINDOW (OUTPATIENT)
Dept: URBAN - NONMETROPOLITAN AREA CLINIC 13 | Facility: CLINIC | Age: 78
End: 2023-03-17
Payer: MEDICARE

## 2023-03-17 VITALS
HEIGHT: 66 IN | WEIGHT: 168.6 LBS | DIASTOLIC BLOOD PRESSURE: 76 MMHG | SYSTOLIC BLOOD PRESSURE: 123 MMHG | HEART RATE: 85 BPM | TEMPERATURE: 98.2 F | BODY MASS INDEX: 27.1 KG/M2

## 2023-03-17 DIAGNOSIS — K59.01 SLOW TRANSIT CONSTIPATION: ICD-10-CM

## 2023-03-17 DIAGNOSIS — K21.9 GASTROESOPHAGEAL REFLUX DISEASE, UNSPECIFIED WHETHER ESOPHAGITIS PRESENT: ICD-10-CM

## 2023-03-17 DIAGNOSIS — R10.9 RIGHT FLANK PAIN: ICD-10-CM

## 2023-03-17 DIAGNOSIS — K76.0 FATTY LIVER: ICD-10-CM

## 2023-03-17 DIAGNOSIS — R19.7 DIARRHEA, UNSPECIFIED TYPE: ICD-10-CM

## 2023-03-17 DIAGNOSIS — R13.10 ESOPHAGEAL DYSPHAGIA: ICD-10-CM

## 2023-03-17 DIAGNOSIS — K86.2 PANCREATIC CYST: ICD-10-CM

## 2023-03-17 PROBLEM — 235595009: Status: ACTIVE | Noted: 2021-06-24

## 2023-03-17 PROBLEM — 162050009: Status: ACTIVE | Noted: 2021-06-24

## 2023-03-17 PROBLEM — 40890009: Status: ACTIVE | Noted: 2021-06-24

## 2023-03-17 PROCEDURE — 99214 OFFICE O/P EST MOD 30 MIN: CPT | Performed by: NURSE PRACTITIONER

## 2023-03-17 RX ORDER — PANTOPRAZOLE SODIUM 40 MG/1
1 TABLET TABLET, DELAYED RELEASE ORAL ONCE A DAY
Qty: 90 | Refills: 3 | Status: ACTIVE | COMMUNITY

## 2023-03-17 RX ORDER — ATORVASTATIN CALCIUM 20 MG/1
TABLET, FILM COATED ORAL
Qty: 90 UNSPECIFIED | Status: ACTIVE | COMMUNITY

## 2023-03-17 RX ORDER — INDOMETHACIN 25 MG/1
1 CAPSULE WITH FOOD OR MILK CAPSULE ORAL TWICE A DAY
Status: ACTIVE | COMMUNITY

## 2023-03-17 RX ORDER — METFORMIN HYDROCHLORIDE 500 MG/1
TABLET ORAL
Qty: 90 UNSPECIFIED | Status: ACTIVE | COMMUNITY

## 2023-03-17 RX ORDER — DULOXETINE HYDROCHLORIDE 30 MG/1
CAPSULE, DELAYED RELEASE ORAL
Qty: 30 UNSPECIFIED | Status: ACTIVE | COMMUNITY

## 2023-03-17 RX ORDER — LOSARTAN POTASSIUM 50 MG/1
TABLET, FILM COATED ORAL
Qty: 90 UNSPECIFIED | Status: ACTIVE | COMMUNITY

## 2023-03-17 RX ORDER — PANTOPRAZOLE SODIUM 40 MG/1
1 TABLET TABLET, DELAYED RELEASE ORAL ONCE A DAY
Qty: 90 | Refills: 3

## 2023-03-17 RX ORDER — ACYCLOVIR 400 MG/1
TABLET ORAL
Qty: 35 UNSPECIFIED | Status: ACTIVE | COMMUNITY

## 2023-03-17 RX ORDER — DICLOFENAC SODIUM 10 MG/G
AS DIRECTED GEL TOPICAL
Status: ACTIVE | COMMUNITY

## 2023-03-17 RX ORDER — LEVOTHYROXINE SODIUM 0.09 MG/1
TABLET ORAL
Qty: 30 UNSPECIFIED | Status: ACTIVE | COMMUNITY

## 2023-03-17 NOTE — HPI-OTHER HISTORIES
10/2021 Patient states that, overall, she is much improved. Her left lower quadrant pain has resolved. Her constipation is improved. She is having a solid, formed stool daily. Her stools do feel adequate. She has no evidence of bleeding. She denies gas or bloating. Her appetite and weight are stable. Her reflux is well controlled. She is on Protonix daily. Her dysphagia has resolved with dilation. She does have fatty liver disease and a pancreatic cyst. She has no symptoms related to these. She does need imaging and labs.  2021 CT abdomen: hepatic steatosis. Table from 2021- CBD 11mm stable. CCY. 1.5 x 1.2 cm cystic lesion adjacent ot the uncinate process of pancreas with communication with pancreatic duct- mild prominence of the pancreatic duct, atrophy of unicate process  8/15/2022 Ms. Brii Douglas is a 76 year old female here of abdominal pain and IBS. She is a patient of Dr Bosch last seen in 2021. She has been on protonix 40mg daily wiht good controll of her reflux. She denies any trouble swallowing. Her main complaint today is LLQ pain. This is tender to palpation. She denies any constipation. She has a BM every day or every other. She had a hysterectomy and her left ovary was left. She is concerned this is related to her ovary.  She had a CT scan last year with stable pancreatic cyst in the uncinate process with communication with the pancreatic duct. Her brother  of pancreatic cancer. Her weight and appetite are stable. CS  2022 Ms. Brii Douglas is here for f/u of abdominal pain. At her last OV, she was having LLQ abdominal pain despite normal BM every day or every other. She was very concerned about her left ovary. She had a CT scan that showed no acute findings. Today, she continues to have the discomfort. Her  is very concerned about her ongoing diarrhea for which she did not complain of prior. He reports this is more than once a week and when it occurs it is frequent and urgent. She denies some of these complaints. Last year, Dr Bosch did a colonoscopy with random bx that were negative. Her  is concerned she has a bacteria. Again the diarrhea is not daily. She had labs with an ortho and had a positive BANDAR. She is concerned she has IBD. She denies any blood in her stool.  She had a CT scan last year with stable pancreatic cyst in the uncinate process with communication with the pancreatic duct. Her brother  of pancreatic cancer. Her CA 19-9 was negative. Her repeat CT scan again showed stability but given her hx and dilated CBD Dr Brock recommended an EUS, She is now ready to proceed. CS

## 2023-03-17 NOTE — HPI-TODAY'S VISIT:
3/17/2023 Ms. Brii Douglas is here for f/u of abdominal pain. She was having abdominal pain and diarrhea at her last OV. She had a CT scan with stable pancreatic cyst in the uncinate process with communication with the pancreatic duct. She had an EUS that showed a IPMN stable. Her brother  of pancreatic cancer. CS

## 2023-12-04 ENCOUNTER — LAB OUTSIDE AN ENCOUNTER (OUTPATIENT)
Dept: URBAN - NONMETROPOLITAN AREA CLINIC 13 | Facility: CLINIC | Age: 78
End: 2023-12-04

## 2023-12-18 ENCOUNTER — CLAIMS CREATED FROM THE CLAIM WINDOW (OUTPATIENT)
Dept: URBAN - NONMETROPOLITAN AREA CLINIC 13 | Facility: CLINIC | Age: 78
End: 2023-12-18
Payer: MEDICARE

## 2023-12-18 ENCOUNTER — DASHBOARD ENCOUNTERS (OUTPATIENT)
Age: 78
End: 2023-12-18

## 2023-12-18 VITALS
SYSTOLIC BLOOD PRESSURE: 114 MMHG | BODY MASS INDEX: 26.68 KG/M2 | HEART RATE: 92 BPM | WEIGHT: 166 LBS | HEIGHT: 66 IN | DIASTOLIC BLOOD PRESSURE: 77 MMHG

## 2023-12-18 DIAGNOSIS — K86.2 PANCREATIC CYST: ICD-10-CM

## 2023-12-18 DIAGNOSIS — Z86.010 PERSONAL HISTORY OF COLONIC POLYPS: ICD-10-CM

## 2023-12-18 DIAGNOSIS — K59.01 SLOW TRANSIT CONSTIPATION: ICD-10-CM

## 2023-12-18 DIAGNOSIS — R13.10 ESOPHAGEAL DYSPHAGIA: ICD-10-CM

## 2023-12-18 DIAGNOSIS — R10.84 ABDOMINAL CRAMPING, GENERALIZED: ICD-10-CM

## 2023-12-18 DIAGNOSIS — K76.0 FATTY LIVER: ICD-10-CM

## 2023-12-18 DIAGNOSIS — R10.9 RIGHT FLANK PAIN: ICD-10-CM

## 2023-12-18 DIAGNOSIS — K21.9 GASTROESOPHAGEAL REFLUX DISEASE, UNSPECIFIED WHETHER ESOPHAGITIS PRESENT: ICD-10-CM

## 2023-12-18 DIAGNOSIS — R19.7 DIARRHEA, UNSPECIFIED TYPE: ICD-10-CM

## 2023-12-18 PROBLEM — 35298007: Status: ACTIVE | Noted: 2021-06-24

## 2023-12-18 PROBLEM — 31258000: Status: ACTIVE | Noted: 2021-10-29

## 2023-12-18 PROCEDURE — 99214 OFFICE O/P EST MOD 30 MIN: CPT | Performed by: NURSE PRACTITIONER

## 2023-12-18 RX ORDER — DICLOFENAC SODIUM 10 MG/G
AS DIRECTED GEL TOPICAL
Status: ON HOLD | COMMUNITY

## 2023-12-18 RX ORDER — PANTOPRAZOLE SODIUM 40 MG/1
1 TABLET TABLET, DELAYED RELEASE ORAL ONCE A DAY
Qty: 90 | Refills: 3

## 2023-12-18 RX ORDER — METFORMIN HYDROCHLORIDE 500 MG/1
TABLET ORAL
Qty: 90 UNSPECIFIED | Status: ON HOLD | COMMUNITY

## 2023-12-18 RX ORDER — LEVOTHYROXINE SODIUM 0.09 MG/1
TABLET ORAL
Qty: 30 UNSPECIFIED | Status: ACTIVE | COMMUNITY

## 2023-12-18 RX ORDER — DULOXETINE HYDROCHLORIDE 30 MG/1
CAPSULE, DELAYED RELEASE ORAL
Qty: 30 UNSPECIFIED | Status: ACTIVE | COMMUNITY

## 2023-12-18 RX ORDER — INDOMETHACIN 25 MG/1
1 CAPSULE WITH FOOD OR MILK CAPSULE ORAL TWICE A DAY
Status: ON HOLD | COMMUNITY

## 2023-12-18 RX ORDER — ACYCLOVIR 400 MG/1
TABLET ORAL
Qty: 35 UNSPECIFIED | Status: ACTIVE | COMMUNITY

## 2023-12-18 RX ORDER — LOSARTAN POTASSIUM 50 MG/1
TABLET, FILM COATED ORAL
Qty: 90 UNSPECIFIED | Status: ACTIVE | COMMUNITY

## 2023-12-18 RX ORDER — PANTOPRAZOLE SODIUM 40 MG/1
1 TABLET TABLET, DELAYED RELEASE ORAL ONCE A DAY
Qty: 90 | Refills: 3 | COMMUNITY

## 2023-12-18 RX ORDER — ATORVASTATIN CALCIUM 20 MG/1
TABLET, FILM COATED ORAL
Qty: 90 UNSPECIFIED | Status: ACTIVE | COMMUNITY

## 2023-12-18 NOTE — HPI-TODAY'S VISIT:
12/18/2023 Ms. Brii Douglas is here for f/u of pancreatic cyst. She has LLQ pain on and off. This has resovled for the most part. Her bowels are normal. Her reflux is well controlled on protonix. She had a CT scan with stable pancreatic cyst in the uncinate process with communication with the pancreatic duct.  Her main issue is back pain. She is having another injection soon. CS

## 2023-12-18 NOTE — HPI-OTHER HISTORIES
10/2021 Patient states that, overall, she is much improved. Her left lower quadrant pain has resolved. Her constipation is improved. She is having a solid, formed stool daily. Her stools do feel adequate. She has no evidence of bleeding. She denies gas or bloating. Her appetite and weight are stable. Her reflux is well controlled. She is on Protonix daily. Her dysphagia has resolved with dilation. She does have fatty liver disease and a pancreatic cyst. She has no symptoms related to these. She does need imaging and labs.  2021 CT abdomen: hepatic steatosis. Table from 2021- CBD 11mm stable. CCY. 1.5 x 1.2 cm cystic lesion adjacent ot the uncinate process of pancreas with communication with pancreatic duct- mild prominence of the pancreatic duct, atrophy of unicate process  8/15/2022 Ms. Brii Douglas is a 76 year old female here of abdominal pain and IBS. She is a patient of Dr Bosch last seen in 2021. She has been on protonix 40mg daily wiht good controll of her reflux. She denies any trouble swallowing. Her main complaint today is LLQ pain. This is tender to palpation. She denies any constipation. She has a BM every day or every other. She had a hysterectomy and her left ovary was left. She is concerned this is related to her ovary.  She had a CT scan last year with stable pancreatic cyst in the uncinate process with communication with the pancreatic duct. Her brother  of pancreatic cancer. Her weight and appetite are stable. CS  2022 Ms. Brii Douglas is here for f/u of abdominal pain. At her last OV, she was having LLQ abdominal pain despite normal BM every day or every other. She was very concerned about her left ovary. She had a CT scan that showed no acute findings. Today, she continues to have the discomfort. Her  is very concerned about her ongoing diarrhea for which she did not complain of prior. He reports this is more than once a week and when it occurs it is frequent and urgent. She denies some of these complaints. Last year, Dr Bosch did a colonoscopy with random bx that were negative. Her  is concerned she has a bacteria. Again the diarrhea is not daily. She had labs with an ortho and had a positive BANDAR. She is concerned she has IBD. She denies any blood in her stool.  She had a CT scan last year with stable pancreatic cyst in the uncinate process with communication with the pancreatic duct. Her brother  of pancreatic cancer. Her CA 19-9 was negative. Her repeat CT scan again showed stability but given her hx and dilated CBD Dr Brock recommended an EUS, She is now ready to proceed. CS 3/17/2023 Ms. Brii Douglas is here for f/u of abdominal pain. She was having abdominal pain and diarrhea at her last OV. She had a CT scan with stable pancreatic cyst in the uncinate process with communication with the pancreatic duct. She had an EUS that showed a IPMN stable. Her brother  of pancreatic cancer. CS

## 2024-06-17 ENCOUNTER — OFFICE VISIT (OUTPATIENT)
Dept: URBAN - NONMETROPOLITAN AREA CLINIC 13 | Facility: CLINIC | Age: 79
End: 2024-06-17

## 2024-06-18 ENCOUNTER — OFFICE VISIT (OUTPATIENT)
Dept: URBAN - NONMETROPOLITAN AREA CLINIC 13 | Facility: CLINIC | Age: 79
End: 2024-06-18
Payer: MEDICARE

## 2024-06-18 ENCOUNTER — LAB OUTSIDE AN ENCOUNTER (OUTPATIENT)
Dept: URBAN - NONMETROPOLITAN AREA CLINIC 13 | Facility: CLINIC | Age: 79
End: 2024-06-18

## 2024-06-18 VITALS
DIASTOLIC BLOOD PRESSURE: 76 MMHG | HEART RATE: 79 BPM | WEIGHT: 169.7 LBS | SYSTOLIC BLOOD PRESSURE: 122 MMHG | BODY MASS INDEX: 27.27 KG/M2 | HEIGHT: 66 IN

## 2024-06-18 DIAGNOSIS — K59.09 CHANGE IN BOWEL MOVEMENTS INTERMITTENT CONSTIPATION. URGENCY IN THE MORNING.: ICD-10-CM

## 2024-06-18 DIAGNOSIS — K86.2 PANCREATIC CYST: ICD-10-CM

## 2024-06-18 DIAGNOSIS — R13.19 CERVICAL DYSPHAGIA: ICD-10-CM

## 2024-06-18 DIAGNOSIS — R10.84 ABDOMINAL CRAMPING, GENERALIZED: ICD-10-CM

## 2024-06-18 PROCEDURE — 99214 OFFICE O/P EST MOD 30 MIN: CPT | Performed by: NURSE PRACTITIONER

## 2024-06-18 RX ORDER — METFORMIN HYDROCHLORIDE 500 MG/1
TABLET ORAL
Qty: 90 UNSPECIFIED | Status: ON HOLD | COMMUNITY

## 2024-06-18 RX ORDER — CYANOCOBALAMIN 1000 UG/ML
1 ML INJECTION INTRAMUSCULAR; SUBCUTANEOUS
Status: ACTIVE | COMMUNITY

## 2024-06-18 RX ORDER — PANTOPRAZOLE SODIUM 40 MG/1
1 TABLET TABLET, DELAYED RELEASE ORAL ONCE A DAY
Qty: 90 | Refills: 3 | Status: ACTIVE | COMMUNITY

## 2024-06-18 RX ORDER — DULOXETINE HYDROCHLORIDE 30 MG/1
CAPSULE, DELAYED RELEASE ORAL
Qty: 30 UNSPECIFIED | Status: ACTIVE | COMMUNITY

## 2024-06-18 RX ORDER — LEVOTHYROXINE SODIUM 0.09 MG/1
TABLET ORAL
Qty: 30 UNSPECIFIED | Status: ACTIVE | COMMUNITY

## 2024-06-18 RX ORDER — PANTOPRAZOLE SODIUM 40 MG/1
1 TABLET TABLET, DELAYED RELEASE ORAL ONCE A DAY
Qty: 90 | Refills: 3

## 2024-06-18 RX ORDER — ACETAMINOPHEN 650 MG/1
1 SUPPOSITORY AS NEEDED SUPPOSITORY RECTAL
Status: ACTIVE | COMMUNITY

## 2024-06-18 RX ORDER — DICLOFENAC SODIUM 10 MG/G
AS DIRECTED GEL TOPICAL
Status: ON HOLD | COMMUNITY

## 2024-06-18 RX ORDER — INDOMETHACIN 25 MG/1
1 CAPSULE WITH FOOD OR MILK CAPSULE ORAL TWICE A DAY
Status: ON HOLD | COMMUNITY

## 2024-06-18 RX ORDER — LOSARTAN POTASSIUM 50 MG/1
TABLET, FILM COATED ORAL
Qty: 90 UNSPECIFIED | Status: ACTIVE | COMMUNITY

## 2024-06-18 RX ORDER — ACYCLOVIR 400 MG/1
TABLET ORAL
Qty: 35 UNSPECIFIED | Status: ACTIVE | COMMUNITY

## 2024-06-18 RX ORDER — ATORVASTATIN CALCIUM 20 MG/1
TABLET, FILM COATED ORAL
Qty: 90 UNSPECIFIED | Status: ACTIVE | COMMUNITY

## 2024-06-18 NOTE — HPI-TODAY'S VISIT:
6/18/2024 Ms. Brii Douglas is here for f/u of pancreatic cyst. She has LLQ pain on and off. This is back. She is having normal BM but a lot of gas and bloating. She has been watching her diet and eating more artifical sugar. Her reflux is well controlled on protonix. She had a CT scan with stable pancreatic cyst in the uncinate process with communication with the pancreatic duct.  Her main issue is back pain She is having trouble moving around at times. She is getting injections. CS

## 2024-06-18 NOTE — HPI-OTHER HISTORIES
10/2021 Patient states that, overall, she is much improved. Her left lower quadrant pain has resolved. Her constipation is improved. She is having a solid, formed stool daily. Her stools do feel adequate. She has no evidence of bleeding. She denies gas or bloating. Her appetite and weight are stable. Her reflux is well controlled. She is on Protonix daily. Her dysphagia has resolved with dilation. She does have fatty liver disease and a pancreatic cyst. She has no symptoms related to these. She does need imaging and labs.  2021 CT abdomen: hepatic steatosis. Table from 2021- CBD 11mm stable. CCY. 1.5 x 1.2 cm cystic lesion adjacent ot the uncinate process of pancreas with communication with pancreatic duct- mild prominence of the pancreatic duct, atrophy of unicate process  8/15/2022 Ms. Brii Douglas is a 76 year old female here of abdominal pain and IBS. She is a patient of Dr Bosch last seen in 2021. She has been on protonix 40mg daily wiht good controll of her reflux. She denies any trouble swallowing. Her main complaint today is LLQ pain. This is tender to palpation. She denies any constipation. She has a BM every day or every other. She had a hysterectomy and her left ovary was left. She is concerned this is related to her ovary.  She had a CT scan last year with stable pancreatic cyst in the uncinate process with communication with the pancreatic duct. Her brother  of pancreatic cancer. Her weight and appetite are stable. CS  2022 Ms. Brii Douglas is here for f/u of abdominal pain. At her last OV, she was having LLQ abdominal pain despite normal BM every day or every other. She was very concerned about her left ovary. She had a CT scan that showed no acute findings. Today, she continues to have the discomfort. Her  is very concerned about her ongoing diarrhea for which she did not complain of prior. He reports this is more than once a week and when it occurs it is frequent and urgent. She denies some of these complaints. Last year, Dr Bosch did a colonoscopy with random bx that were negative. Her  is concerned she has a bacteria. Again the diarrhea is not daily. She had labs with an ortho and had a positive BANDAR. She is concerned she has IBD. She denies any blood in her stool.  She had a CT scan last year with stable pancreatic cyst in the uncinate process with communication with the pancreatic duct. Her brother  of pancreatic cancer. Her CA 19-9 was negative. Her repeat CT scan again showed stability but given her hx and dilated CBD Dr Brock recommended an EUS, She is now ready to proceed. CS 3/17/2023 Ms. Brii Douglas is here for f/u of abdominal pain. She was having abdominal pain and diarrhea at her last OV. She had a CT scan with stable pancreatic cyst in the uncinate process with communication with the pancreatic duct. She had an EUS that showed a IPMN stable. Her brother  of pancreatic cancer. CS  2023 Ms. Brii Douglas is here for f/u of pancreatic cyst. She has LLQ pain on and off. This has resovled for the most part. Her bowels are normal. Her reflux is well controlled on protonix. She had a CT scan with stable pancreatic cyst in the uncinate process with communication with the pancreatic duct.  Her main issue is back pain. She is having another injection soon. CS

## 2024-06-28 ENCOUNTER — TELEPHONE ENCOUNTER (OUTPATIENT)
Dept: URBAN - NONMETROPOLITAN AREA CLINIC 2 | Facility: CLINIC | Age: 79
End: 2024-06-28

## 2024-12-18 ENCOUNTER — OFFICE VISIT (OUTPATIENT)
Dept: URBAN - NONMETROPOLITAN AREA CLINIC 13 | Facility: CLINIC | Age: 79
End: 2024-12-18
Payer: MEDICARE

## 2024-12-18 ENCOUNTER — LAB OUTSIDE AN ENCOUNTER (OUTPATIENT)
Dept: URBAN - NONMETROPOLITAN AREA CLINIC 13 | Facility: CLINIC | Age: 79
End: 2024-12-18

## 2024-12-18 VITALS
SYSTOLIC BLOOD PRESSURE: 144 MMHG | WEIGHT: 171 LBS | BODY MASS INDEX: 27.48 KG/M2 | DIASTOLIC BLOOD PRESSURE: 84 MMHG | HEIGHT: 66 IN | HEART RATE: 69 BPM

## 2024-12-18 DIAGNOSIS — R19.7 DIARRHEA, UNSPECIFIED TYPE: ICD-10-CM

## 2024-12-18 DIAGNOSIS — K59.01 SLOW TRANSIT CONSTIPATION: ICD-10-CM

## 2024-12-18 DIAGNOSIS — R13.10 ESOPHAGEAL DYSPHAGIA: ICD-10-CM

## 2024-12-18 DIAGNOSIS — K76.0 FATTY LIVER: ICD-10-CM

## 2024-12-18 DIAGNOSIS — Z86.0100 HISTORY OF COLON POLYPS: ICD-10-CM

## 2024-12-18 DIAGNOSIS — K21.9 GASTROESOPHAGEAL REFLUX DISEASE, UNSPECIFIED WHETHER ESOPHAGITIS PRESENT: ICD-10-CM

## 2024-12-18 DIAGNOSIS — R10.9 RIGHT FLANK PAIN: ICD-10-CM

## 2024-12-18 DIAGNOSIS — K86.2 PANCREATIC CYST: ICD-10-CM

## 2024-12-18 PROCEDURE — 99214 OFFICE O/P EST MOD 30 MIN: CPT | Performed by: NURSE PRACTITIONER

## 2024-12-18 RX ORDER — LOSARTAN POTASSIUM 50 MG/1
TABLET, FILM COATED ORAL
Qty: 90 UNSPECIFIED | Status: ACTIVE | COMMUNITY

## 2024-12-18 RX ORDER — METFORMIN HYDROCHLORIDE 500 MG/1
TABLET ORAL
Qty: 90 UNSPECIFIED | Status: ON HOLD | COMMUNITY

## 2024-12-18 RX ORDER — ACYCLOVIR 400 MG/1
TABLET ORAL
Qty: 35 UNSPECIFIED | Status: ACTIVE | COMMUNITY

## 2024-12-18 RX ORDER — PANTOPRAZOLE SODIUM 40 MG/1
1 TABLET TABLET, DELAYED RELEASE ORAL ONCE A DAY
Qty: 90 | Refills: 3

## 2024-12-18 RX ORDER — DICLOFENAC SODIUM 10 MG/G
AS DIRECTED GEL TOPICAL
Status: ON HOLD | COMMUNITY

## 2024-12-18 RX ORDER — LEVOTHYROXINE SODIUM 0.09 MG/1
TABLET ORAL
Qty: 30 UNSPECIFIED | Status: ACTIVE | COMMUNITY

## 2024-12-18 RX ORDER — ACETAMINOPHEN 650 MG/1
1 SUPPOSITORY AS NEEDED SUPPOSITORY RECTAL
Status: ACTIVE | COMMUNITY

## 2024-12-18 RX ORDER — PANTOPRAZOLE SODIUM 40 MG/1
1 TABLET TABLET, DELAYED RELEASE ORAL ONCE A DAY
Qty: 90 | Refills: 3 | Status: ACTIVE | COMMUNITY

## 2024-12-18 RX ORDER — CYANOCOBALAMIN 1000 UG/ML
1 ML INJECTION INTRAMUSCULAR; SUBCUTANEOUS
Status: ACTIVE | COMMUNITY

## 2024-12-18 RX ORDER — DULOXETINE HYDROCHLORIDE 30 MG/1
CAPSULE, DELAYED RELEASE ORAL
Qty: 30 UNSPECIFIED | Status: ACTIVE | COMMUNITY

## 2024-12-18 RX ORDER — INDOMETHACIN 25 MG/1
1 CAPSULE WITH FOOD OR MILK CAPSULE ORAL TWICE A DAY
Status: ON HOLD | COMMUNITY

## 2024-12-18 RX ORDER — ATORVASTATIN CALCIUM 20 MG/1
TABLET, FILM COATED ORAL
Qty: 90 UNSPECIFIED | Status: ACTIVE | COMMUNITY

## 2024-12-18 NOTE — HPI-TODAY'S VISIT:
12/18/2024 Ms. Brii Douglas is here for f/u of pancreatic cyst. She is feeling well today. Her bowels are normal. She denies any abdominal pain. She denies any gas/bloating since watching her diet and avoiding artifical sugar. Her reflux is well controlled. She had a repeat CT scan that showed the pancreatic cyst to be a little smaller but her pancreatic ductal dilatation to be slightly increased. She denies any changes in her weight or appetite.  Her main issue is back pain. She has had to start taking pain meds on and off. CS

## 2024-12-18 NOTE — HPI-OTHER HISTORIES
10/2021 Patient states that, overall, she is much improved. Her left lower quadrant pain has resolved. Her constipation is improved. She is having a solid, formed stool daily. Her stools do feel adequate. She has no evidence of bleeding. She denies gas or bloating. Her appetite and weight are stable. Her reflux is well controlled. She is on Protonix daily. Her dysphagia has resolved with dilation. She does have fatty liver disease and a pancreatic cyst. She has no symptoms related to these. She does need imaging and labs.  2021 CT abdomen: hepatic steatosis. Table from 2021- CBD 11mm stable. CCY. 1.5 x 1.2 cm cystic lesion adjacent ot the uncinate process of pancreas with communication with pancreatic duct- mild prominence of the pancreatic duct, atrophy of unicate process  8/15/2022 Ms. Brii Douglas is a 76 year old female here of abdominal pain and IBS. She is a patient of Dr Bosch last seen in 2021. She has been on protonix 40mg daily wiht good controll of her reflux. She denies any trouble swallowing. Her main complaint today is LLQ pain. This is tender to palpation. She denies any constipation. She has a BM every day or every other. She had a hysterectomy and her left ovary was left. She is concerned this is related to her ovary.  She had a CT scan last year with stable pancreatic cyst in the uncinate process with communication with the pancreatic duct. Her brother  of pancreatic cancer. Her weight and appetite are stable. CS  2022 Ms. Brii Douglas is here for f/u of abdominal pain. At her last OV, she was having LLQ abdominal pain despite normal BM every day or every other. She was very concerned about her left ovary. She had a CT scan that showed no acute findings. Today, she continues to have the discomfort. Her  is very concerned about her ongoing diarrhea for which she did not complain of prior. He reports this is more than once a week and when it occurs it is frequent and urgent. She denies some of these complaints. Last year, Dr Bosch did a colonoscopy with random bx that were negative. Her  is concerned she has a bacteria. Again the diarrhea is not daily. She had labs with an ortho and had a positive BANDAR. She is concerned she has IBD. She denies any blood in her stool.  She had a CT scan last year with stable pancreatic cyst in the uncinate process with communication with the pancreatic duct. Her brother  of pancreatic cancer. Her CA 19-9 was negative. Her repeat CT scan again showed stability but given her hx and dilated CBD Dr Brock recommended an EUS, She is now ready to proceed. CS 3/17/2023 Ms. Brii Douglas is here for f/u of abdominal pain. She was having abdominal pain and diarrhea at her last OV. She had a CT scan with stable pancreatic cyst in the uncinate process with communication with the pancreatic duct. She had an EUS that showed a IPMN stable. Her brother  of pancreatic cancer. CS  2023 Ms. Brii Douglas is here for f/u of pancreatic cyst. She has LLQ pain on and off. This has resovled for the most part. Her bowels are normal. Her reflux is well controlled on protonix. She had a CT scan with stable pancreatic cyst in the uncinate process with communication with the pancreatic duct.  Her main issue is back pain. She is having another injection soon. CS  2024 Ms. Brii Douglas is here for f/u of pancreatic cyst. She has LLQ pain on and off. This is back. She is having normal BM but a lot of gas and bloating. She has been watching her diet and eating more artifical sugar. Her reflux is well controlled on protonix. She had a CT scan with stable pancreatic cyst in the uncinate process with communication with the pancreatic duct.  Her main issue is back pain She is having trouble moving around at times. She is getting injections. CS  12/3/2024 CT: pancreatic duct at the body the pancreas measures 7.6 mm. There is focal atrophy of the parenchyma in the body of the pancreas. Small cystic lesion involving the uncinate process appears decreased in size compared to 2024. The cystic lesion ventral to the uncinate process separate from the uncinate process image 81 series 2 now measures 9 x 14.5 mm decreased from 11.5 x 17.5 mm

## 2025-01-17 ENCOUNTER — OFFICE VISIT (OUTPATIENT)
Dept: URBAN - METROPOLITAN AREA MEDICAL CENTER 1 | Facility: MEDICAL CENTER | Age: 80
End: 2025-01-17
Payer: MEDICARE

## 2025-01-17 DIAGNOSIS — K86.89 OTHER SPECIFIED DISEASES OF PANCREAS: ICD-10-CM

## 2025-01-17 PROCEDURE — 43259 EGD US EXAM DUODENUM/JEJUNUM: CPT | Performed by: INTERNAL MEDICINE

## 2025-02-13 ENCOUNTER — OFFICE VISIT (OUTPATIENT)
Dept: URBAN - NONMETROPOLITAN AREA CLINIC 13 | Facility: CLINIC | Age: 80
End: 2025-02-13
Payer: MEDICARE

## 2025-02-13 VITALS
BODY MASS INDEX: 27 KG/M2 | HEART RATE: 76 BPM | WEIGHT: 168 LBS | SYSTOLIC BLOOD PRESSURE: 122 MMHG | HEIGHT: 66 IN | DIASTOLIC BLOOD PRESSURE: 76 MMHG

## 2025-02-13 DIAGNOSIS — K59.01 SLOW TRANSIT CONSTIPATION: ICD-10-CM

## 2025-02-13 DIAGNOSIS — R10.32 ABDOMINAL CRAMPING IN LEFT LOWER QUADRANT: ICD-10-CM

## 2025-02-13 DIAGNOSIS — R19.7 DIARRHEA, UNSPECIFIED TYPE: ICD-10-CM

## 2025-02-13 DIAGNOSIS — R13.10 ESOPHAGEAL DYSPHAGIA: ICD-10-CM

## 2025-02-13 DIAGNOSIS — Z86.0100 HISTORY OF COLON POLYPS: ICD-10-CM

## 2025-02-13 DIAGNOSIS — K86.2 PANCREATIC CYST: ICD-10-CM

## 2025-02-13 DIAGNOSIS — K21.9 GASTROESOPHAGEAL REFLUX DISEASE, UNSPECIFIED WHETHER ESOPHAGITIS PRESENT: ICD-10-CM

## 2025-02-13 DIAGNOSIS — K76.0 FATTY LIVER: ICD-10-CM

## 2025-02-13 PROCEDURE — 99214 OFFICE O/P EST MOD 30 MIN: CPT | Performed by: NURSE PRACTITIONER

## 2025-02-13 RX ORDER — HYOSCYAMINE SULFATE 0.12 MG/1
1 TABLET UNDER THE TONGUE AND ALLOW TO DISSOLVE EVERY 4-6 HRS AS NEEDED FOR ABDOMINAL PAIN/SPASM TABLET, ORALLY DISINTEGRATING ORAL
Qty: 90 | Refills: 3 | OUTPATIENT
Start: 2025-02-13 | End: 2025-06-13

## 2025-02-13 RX ORDER — PANTOPRAZOLE SODIUM 40 MG/1
1 TABLET TABLET, DELAYED RELEASE ORAL ONCE A DAY
Qty: 90 | Refills: 3

## 2025-02-13 RX ORDER — LEVOTHYROXINE SODIUM 0.09 MG/1
TABLET ORAL
Qty: 30 UNSPECIFIED | Status: ACTIVE | COMMUNITY

## 2025-02-13 RX ORDER — CYANOCOBALAMIN 1000 UG/ML
1 ML INJECTION INTRAMUSCULAR; SUBCUTANEOUS
Status: ACTIVE | COMMUNITY

## 2025-02-13 RX ORDER — INDOMETHACIN 25 MG/1
1 CAPSULE WITH FOOD OR MILK CAPSULE ORAL TWICE A DAY
Status: ON HOLD | COMMUNITY

## 2025-02-13 RX ORDER — DICLOFENAC SODIUM 10 MG/G
AS DIRECTED GEL TOPICAL
Status: ON HOLD | COMMUNITY

## 2025-02-13 RX ORDER — PANTOPRAZOLE SODIUM 40 MG/1
1 TABLET TABLET, DELAYED RELEASE ORAL ONCE A DAY
Qty: 90 | Refills: 3 | Status: ACTIVE | COMMUNITY

## 2025-02-13 RX ORDER — LOSARTAN POTASSIUM 50 MG/1
TABLET, FILM COATED ORAL
Qty: 90 UNSPECIFIED | Status: ACTIVE | COMMUNITY

## 2025-02-13 RX ORDER — ACETAMINOPHEN 650 MG/1
1 SUPPOSITORY AS NEEDED SUPPOSITORY RECTAL
Status: ACTIVE | COMMUNITY

## 2025-02-13 RX ORDER — METFORMIN HYDROCHLORIDE 500 MG/1
TABLET ORAL
Qty: 90 UNSPECIFIED | Status: ON HOLD | COMMUNITY

## 2025-02-13 RX ORDER — ACYCLOVIR 400 MG/1
TABLET ORAL
Qty: 35 UNSPECIFIED | Status: ACTIVE | COMMUNITY

## 2025-02-13 RX ORDER — ATORVASTATIN CALCIUM 20 MG/1
TABLET, FILM COATED ORAL
Qty: 90 UNSPECIFIED | Status: ACTIVE | COMMUNITY

## 2025-02-13 RX ORDER — DULOXETINE HYDROCHLORIDE 30 MG/1
CAPSULE, DELAYED RELEASE ORAL
Qty: 30 UNSPECIFIED | Status: ACTIVE | COMMUNITY

## 2025-02-13 RX ORDER — FLUOXETINE HYDROCHLORIDE 10 MG/1
CAPSULE ORAL
Qty: 90 CAPSULE | Status: ACTIVE | COMMUNITY

## 2025-02-13 NOTE — HPI-OTHER HISTORIES
10/2021 Patient states that, overall, she is much improved. Her left lower quadrant pain has resolved. Her constipation is improved. She is having a solid, formed stool daily. Her stools do feel adequate. She has no evidence of bleeding. She denies gas or bloating. Her appetite and weight are stable. Her reflux is well controlled. She is on Protonix daily. Her dysphagia has resolved with dilation. She does have fatty liver disease and a pancreatic cyst. She has no symptoms related to these. She does need imaging and labs.  2021 CT abdomen: hepatic steatosis. Table from 2021- CBD 11mm stable. CCY. 1.5 x 1.2 cm cystic lesion adjacent ot the uncinate process of pancreas with communication with pancreatic duct- mild prominence of the pancreatic duct, atrophy of unicate process  8/15/2022 Ms. Brii Douglas is a 76 year old female here of abdominal pain and IBS. She is a patient of Dr Bosch last seen in 2021. She has been on protonix 40mg daily wiht good controll of her reflux. She denies any trouble swallowing. Her main complaint today is LLQ pain. This is tender to palpation. She denies any constipation. She has a BM every day or every other. She had a hysterectomy and her left ovary was left. She is concerned this is related to her ovary.  She had a CT scan last year with stable pancreatic cyst in the uncinate process with communication with the pancreatic duct. Her brother  of pancreatic cancer. Her weight and appetite are stable. CS  2022 Ms. Brii Douglas is here for f/u of abdominal pain. At her last OV, she was having LLQ abdominal pain despite normal BM every day or every other. She was very concerned about her left ovary. She had a CT scan that showed no acute findings. Today, she continues to have the discomfort. Her  is very concerned about her ongoing diarrhea for which she did not complain of prior. He reports this is more than once a week and when it occurs it is frequent and urgent. She denies some of these complaints. Last year, Dr Bosch did a colonoscopy with random bx that were negative. Her  is concerned she has a bacteria. Again the diarrhea is not daily. She had labs with an ortho and had a positive BANDAR. She is concerned she has IBD. She denies any blood in her stool.  She had a CT scan last year with stable pancreatic cyst in the uncinate process with communication with the pancreatic duct. Her brother  of pancreatic cancer. Her CA 19-9 was negative. Her repeat CT scan again showed stability but given her hx and dilated CBD Dr Brock recommended an EUS, She is now ready to proceed. CS 3/17/2023 Ms. Brii Douglas is here for f/u of abdominal pain. She was having abdominal pain and diarrhea at her last OV. She had a CT scan with stable pancreatic cyst in the uncinate process with communication with the pancreatic duct. She had an EUS that showed a IPMN stable. Her brother  of pancreatic cancer. CS  2023 Ms. Brii Douglas is here for f/u of pancreatic cyst. She has LLQ pain on and off. This has resovled for the most part. Her bowels are normal. Her reflux is well controlled on protonix. She had a CT scan with stable pancreatic cyst in the uncinate process with communication with the pancreatic duct.  Her main issue is back pain. She is having another injection soon. CS  2024 Ms. Brii Douglas is here for f/u of pancreatic cyst. She has LLQ pain on and off. This is back. She is having normal BM but a lot of gas and bloating. She has been watching her diet and eating more artifical sugar. Her reflux is well controlled on protonix. She had a CT scan with stable pancreatic cyst in the uncinate process with communication with the pancreatic duct.  Her main issue is back pain She is having trouble moving around at times. She is getting injections. CS  12/3/2024 CT: pancreatic duct at the body the pancreas measures 7.6 mm. There is focal atrophy of the parenchyma in the body of the pancreas. Small  cystic lesion involving the uncinate process appears decreased in size  compared to 2024. The cystic lesion ventral to the uncinate process  separate from the uncinate process image 81 series 2 now measures 9 x  14.5 mm decreased from 11.5 x 17.5 mm  2024 Ms. Brii Douglas is here for f/u of pancreatic cyst. She is feeling well today. Her bowels are normal. She denies any abdominal pain. She denies any gas/bloating since watching her diet and avoiding artifical sugar. Her reflux is well controlled. She had a repeat CT scan that showed the pancreatic cyst to be a little smaller but her pancreatic ductal dilatation to be slightly increased. She denies any changes in her weight or appetite.  Her main issue is back pain. She has had to start taking pain meds on and off. CS  2025 EUS: normal esophagus, stomach, duodenum, CBD 7mm, cystic lesion in the uncinate process, PD 7mm in HOP, 5mm in body, 2mm in tail- no obstructing lesion, calcification.

## 2025-02-13 NOTE — HPI-TODAY'S VISIT:
2/13/2025 Ms. Brii Douglas is here for f/u of pancreatic cyst. Her last CT scan showed some changes of her pancreatic duct. She had an EUS with no concerning findings. She did have some sinus issues and stomach bug recently. She is getting back to normal. Her reflux is well controlled. Overall she feels well.  CS

## 2025-08-13 ENCOUNTER — LAB OUTSIDE AN ENCOUNTER (OUTPATIENT)
Dept: URBAN - NONMETROPOLITAN AREA CLINIC 13 | Facility: CLINIC | Age: 80
End: 2025-08-13